# Patient Record
Sex: FEMALE | Race: BLACK OR AFRICAN AMERICAN | Employment: UNEMPLOYED | URBAN - METROPOLITAN AREA
[De-identification: names, ages, dates, MRNs, and addresses within clinical notes are randomized per-mention and may not be internally consistent; named-entity substitution may affect disease eponyms.]

---

## 2021-03-01 ENCOUNTER — OFFICE VISIT (OUTPATIENT)
Dept: ORTHOPEDIC SURGERY | Age: 56
End: 2021-03-01
Payer: MEDICARE

## 2021-03-01 VITALS
HEIGHT: 63 IN | TEMPERATURE: 98 F | SYSTOLIC BLOOD PRESSURE: 112 MMHG | RESPIRATION RATE: 17 BRPM | DIASTOLIC BLOOD PRESSURE: 78 MMHG | BODY MASS INDEX: 41.99 KG/M2 | WEIGHT: 237 LBS | OXYGEN SATURATION: 98 % | HEART RATE: 97 BPM

## 2021-03-01 DIAGNOSIS — M19.072 PRIMARY OSTEOARTHRITIS OF LEFT ANKLE: ICD-10-CM

## 2021-03-01 DIAGNOSIS — M25.572 ACUTE LEFT ANKLE PAIN: ICD-10-CM

## 2021-03-01 DIAGNOSIS — M76.822 POSTERIOR TIBIAL TENDINITIS OF LEFT LOWER EXTREMITY: Primary | ICD-10-CM

## 2021-03-01 DIAGNOSIS — M76.822 POSTERIOR TIBIAL TENDINITIS OF LEFT LOWER EXTREMITY: ICD-10-CM

## 2021-03-01 PROCEDURE — 73610 X-RAY EXAM OF ANKLE: CPT | Performed by: ORTHOPAEDIC SURGERY

## 2021-03-01 PROCEDURE — 99203 OFFICE O/P NEW LOW 30 MIN: CPT | Performed by: ORTHOPAEDIC SURGERY

## 2021-03-01 RX ORDER — HYDROCODONE BITARTRATE AND ACETAMINOPHEN 5; 325 MG/1; MG/1
TABLET ORAL
COMMUNITY

## 2021-03-01 RX ORDER — LEG BRACE
EACH MISCELLANEOUS
Qty: 1 EACH | Refills: 0 | Status: SHIPPED | OUTPATIENT
Start: 2021-03-01 | End: 2021-03-01 | Stop reason: CLARIF

## 2021-03-01 NOTE — PROGRESS NOTES
AMBULATORY PROGRESS NOTE      Patient: Diomedes Silva             MRN: [id number]     SSN: [id number] Body mass index is 41.98 kg/m². YOB: 1965     AGE: 54 y.o. EX: female    PCP: Gustavo Hampton MD       IMPRESSION //  DIAGNOSIS AND TREATMENT PLAN        Aby Lennon has 3-month onset, of worsening pain discomfort and swelling to the posterior medial portion of her left hindfoot. She clinically has left posterior tibial tendinitis, and has distinct swelling to the posterior medial hindfoot. Because of this asymmetric, and distinct swelling the posterior medial portion left hindfoot and tenderness of posterior tibial tendon, inability to form a single stance rise, and recommending MRI of her left ankle to assess the posterior tibial tendon, spring ligament deltoid ligament, and medial talonavicular capsule, and medial gutter of his left ankle. DIAGNOSES  1. Posterior tibial tendinitis of left lower extremity    2. Primary osteoarthritis of left ankle    3. Acute left ankle pain        Orders Placed This Encounter    Generic Supply Order     Short CAM boot    [id number] Ankle Min 3V     Order Specific Question:   Weight bearing? Answer:   No    MRI ANKLE LT WO CONT     Standing Status:   Future     Standing Expiration Date:   5/1/2021     Order Specific Question:   Arthrogram study     Answer:   No    HYDROcodone-acetaminophen (NORCO) 5-325 mg per tablet     Sig: Take  by mouth.  DISCONTD: Leg Brace (Ankle Brace) misc     Sig: by Does Not Apply route. SHORT CAM BOOT     Dispense:  1 Each     Refill:  0        PLAN:    1. Short cam walker boot will be given to her placed on her today for her left side left ankle: This is for the posterior tibial tendon dysfunction, tendinitis  2.   MRI left ankle to assess the posterior tib tendon, spring ligament, and deltoid, talofibular ligament, medial ankle architecture      RTO-I will see her after the MRI is conducted    Patricio  expresses understanding of the diagnosis, treatment plan, and all of their proposed questions were answered to their satisfaction. Patient education has been provided re the diagnoses. Patricio may have a reminder for a \"due or due soon\" health maintenance. I have asked that she contact her primary care provider for follow-up on this health maintenance. HPI //  OBJECTIVE EXAMINATION        Aby Conway IS A 54 y.o. female who is a/an  new patient , presenting to my outpatient office for evaluation of  the following chief complaint(s):     Chief Complaint   Patient presents with    Ankle Pain     left        She presents today, with a 2-3, almost 4-month history of disabling posterior medial left ankle pain. She is seeing the foot and ankle specialist, 1 foot to foot, with a gave her ankle brace, but she is to having disabling pain to the posterior medial portion of left hindfoot. Her typical day starts off with stiffness pain soreness to the medial portion of left ankle, and with increased time during the day, she has worsening swelling and pain in walks with a discernible limp. The patient denies any fever, shakes, or chills. The patient denies any recent falls, twists, or trauma. Patricio denies any known history of rheumatoid arthritis, lupus, gout, psoriasis, or known history of inflammatory arthropathy. Visit Vitals  /78 (BP 1 Location: Left upper arm)   Pulse 97   Temp 98 °F (36.7 °C)   Resp 17   Ht 5' 3\" (1.6 m)   Wt 237 lb (107.5 kg)   SpO2 98%   BMI 41.98 kg/m²       Appearance: Alert, well appearing and pleasant patient who is in no distress, oriented to person, place/time, and who follows commands. This patient is accompanied in the examination room by her  self. There is signs of: no dementia  Psychiatric: Affect/mood are appropriate.  Speech normal in context and clarity, memory intact grossly, no involuntary movements - tremors. Patient arrives to office via: without assistive device:    H EENT (2): Head normocephalic & atraumatic. Both pupils are round     Eye: EOM are intact // Neck: ROM WNL  //  Hearings Intact   Respiratory: Breathing non labored         ANKLE/FOOT left    Psychiatry: Alert, Oriented x 3; Speech normal in context and clarity,            Memory intact grossly, no involuntary movements - tremors, no dementia  Visit Vitals  /78 (BP 1 Location: Left upper arm)   Pulse 97   Temp 98 °F (36.7 °C)   Resp 17   Ht 5' 3\" (1.6 m)   Wt 237 lb (107.5 kg)   SpO2 98%   BMI 41.98 kg/m²      Gait: antalgic and slow  Tenderness: moderate tenderness to posterior tibial tendon,            no to plantar fascia, moderate PM spring ligament, no  for calf or gastrocnemius muscle regions  Cutaneous: moderate swelling to posterior tibial tendon, distinct swelling, to the medial malleoli region of her left ankle           otherwise WNL   Joint Motion: Normal ROM noted to ankle/foot,  Inversion motion is MILDLY diminished. Joint / Tendon Stability: No Ankle or Subtalar instability or joint laxity. No peroneal sublux ability or dislocation  Alignment: Hindfoot remains in mild valgus position with single stance rise attempt    Hindfoot alignment when patient seated: neutral         Calcaneo fibular Impingement is not present with weight bearing   Abduction of hindfoot at TN present, midfoot not present          Medial column collapse TN present, planovalgus alignment is present           Forefoot compensatory Varus is not present  Contractures:  Achilles not present, Gastrocnemius Contractures present  Neuro Motor/Sensory: normal 5/5 strength in all tested muscle groups, no fasciculations noted and no involuntary movements // normal light touch sensation,   Vascular: NL foot/ankle pulses  Lymphatics: No extremity lymphedema, No calf swelling, no tenderness to calf muscles.           CHART REVIEW Sallyanne Duane has been experiencing pain and discomfort confirmed as outlined in the pain assessment outlined below.  was reviewed by Amanda Morrow MD on 3/1/2021. Pain Assessment  3/1/2021   Location of Pain Ankle   Location Modifiers Left   Severity of Pain 0   Quality of Pain Aching; Throbbing   Frequency of Pain Intermittent   Aggravating Factors Standing;Walking   Limiting Behavior No   Relieving Factors (No Data)   Relieving Factors Comment pain medication   Result of Injury No        Dinita Shade Hull  has a past medical history of Diabetes ([address] High cholesterol, Hypertension, and Sleep apnea. Patients is employed at:         Past Medical History:   Diagnosis Date    Diabetes ([address]     High cholesterol     Hypertension     Sleep apnea      Past Surgical History:   Procedure Laterality Date    HX HERNIA REPAIR      HX KNEE REPLACEMENT       Current Outpatient Medications   Medication Sig    HYDROcodone-acetaminophen (NORCO) 5-325 mg per tablet Take  by mouth. No current facility-administered medications for this visit. No Known Allergies  Social History     Occupational History    Not on file   Tobacco Use    Smoking status: Never Smoker    Smokeless tobacco: Never Used   Substance and Sexual Activity    Alcohol use: Yes     Comment: occasional    Drug use: Never    Sexual activity: Not on file     Family History   Problem Relation Age of Onset    Heart Failure Mother     Kidney Disease Sister     Cancer Brother         DIAGNOSTIC LAB DATA      No results found for: HBA1C, HGBE8, EOK0INMB, OMX2TEAM // No results found for: GLU, GLUCPOC     No results found for: DVK7MVPX, XVF4RHYH      No results found for: VITD3, XQVID2, XQVID3, XQVID, VD3RIA, MQOL85IYWTO      REVIEW OF SYSTEMS : 3/1/2021  ALL BELOW ARE Negative except : SEE HPI     All other systems reviewed and are negative. 12 point review of systems otherwise negative unless noted in HPI. DIAGNOSTIC IMAGING       ANKLE X RAYS 3 VIEWS Left   X RAYS AT [facility] [address] [place] [address]  3/1/2021     NON WEIGHT BEARING    SOFT TISSUES:              mild fibula region, moderate medial aspect       mild dorsal midfoot/forefoot       No Ankle joint effusion seen       Soft tissue calcifications not present,        Calcified blood vessels not present    OSSEOUS:         No fractures, subluxations, dislocations       No Osteochondral defects seen  Mineralization: suggests Normal Bone  Bone Spurs Large bone spur seen the dorsal part of this left talus, and the lateral radiographic x-ray, small incidental bone spur seen in prescription of calcaneus, there is some mild spurring and a second old ossicle seen embedded in the deltoid ligament, medially. ALIGNMENT:    Ankle mortise alignment is: Congruent    Tibial plafond and talar dome intact        I have personally reviewed these images of the above study. The interpretation of this study is my professional opinion    Petey Cho MD  3/1/2021  10:21 AM              On this date 03/01/2021 I have spent 35 minutes reviewing previous notes, test results and face to face with the patient discussing the diagnosis and importance of compliance with the treatment plan as well as documenting on the day of the visit. An electronic signature was used to authenticate this note. Petey Cho MD  3/1/2021  7:36 AM      Disclaimer: Sections of this note are dictated using utilizing voice recognition software, which may have resulted in some phonetic based errors in grammar and contents. Even though attempts were made to correct all the mistakes, some may have been missed, and remained in the body of the document. If questions arise, please contact our department.      Petey Cho MD  3/1/2021  7:36 AM

## 2021-03-01 NOTE — PATIENT INSTRUCTIONS
Tendon Injury (Tendinopathy): Care Instructions Your Care Instructions Tendons are tough, flexible tissues that connect muscle to bone. A tendon can hurt or get torn from overuse or aging, especially tendons in the shoulder, elbow, wrist, hip, knee, or ankle. Tendon injuries may be called tendinopathy or tendinitis. Tendon injuries can occur from any motion you have to repeat in a job, sports, or daily activities. Tennis elbow is one common tendon injury. You can treat most tendon problems with over-the-counter pain medicine, rest, changes in your activities, and physical therapy. Follow-up care is a key part of your treatment and safety. Be sure to make and go to all appointments, and call your doctor if you are having problems. It's also a good idea to know your test results and keep a list of the medicines you take. How can you care for yourself at home? · Rest the sore area. You may have to stop doing the activity that caused the tendon pain for a while. · Take an over-the-counter pain medicine, such as acetaminophen (Tylenol), ibuprofen (Advil, Motrin), or naproxen (Aleve). Read and follow all instructions on the label. · Do not take two or more pain medicines at the same time unless the doctor told you to. Many pain medicines have acetaminophen, which is Tylenol. Too much acetaminophen (Tylenol) can be harmful. · Put ice or a cold pack on the sore area for 10 to 20 minutes at a time. Try to do this every 1 to 2 hours for the next 3 days (when you are awake) or until any swelling goes down. Put a thin cloth between the ice and your skin. · Prop up the sore area on a pillow when you ice it or anytime you sit or lie down during the next 3 days. Try to keep it above the level of your heart. This will help reduce swelling.  
· Follow your doctor's advice for wearing and caring for a sling, splint, or cast. In some cases, you may wear one of these for a while to help your tendon heal. 
 · Follow your doctor's advice for stretching and physical therapy. Gently move your joint through its full range of motion. This will prevent stiffness in your joint. · Go back to your activity slowly. Warm up before and stretch after the activity. You also can try making some changes. For example, if a sport caused your tendon pain, alternate the sport with another activity. If using a tool causes pain, switch hands or change your . Stop the activity if it hurts. After the activity, apply ice to prevent pain and swelling. · Do not smoke. Smoking can slow healing. If you need help quitting, talk to your doctor about stop-smoking programs and medicines. These can increase your chances of quitting for good. When should you call for help? Watch closely for changes in your health, and be sure to contact your doctor if: 
  · Your pain gets worse.  
  · You do not get better as expected. Where can you learn more? Go to http://www.gray.com/ Enter A157 in the search box to learn more about \"Tendon Injury (Tendinopathy): Care Instructions. \" Current as of: March 2, 2020               Content Version: 12.6 © 1332-1987 [employer]. Care instructions adapted under license by [employer] (which disclaims liability or warranty for this information). If you have questions about a medical condition or this instruction, always ask your healthcare professional. [address] any warranty or liability for your use of this information.

## 2021-03-02 ENCOUNTER — TELEPHONE (OUTPATIENT)
Dept: ORTHOPEDIC SURGERY | Age: 56
End: 2021-03-02

## 2021-03-02 NOTE — TELEPHONE ENCOUNTER
Patients Order, demographics and office note were faxed to [employer] requesting appt at [facility].  Tel# [phone] fax# [phone]

## 2021-04-26 ENCOUNTER — OFFICE VISIT (OUTPATIENT)
Dept: ORTHOPEDIC SURGERY | Age: 56
End: 2021-04-26
Payer: MEDICARE

## 2021-04-26 VITALS — WEIGHT: 232 LBS | HEIGHT: 63 IN | BODY MASS INDEX: 41.11 KG/M2 | TEMPERATURE: 97.7 F

## 2021-04-26 DIAGNOSIS — M19.072 OSTEOARTHRITIS OF LEFT FOOT, UNSPECIFIED OSTEOARTHRITIS TYPE: Primary | ICD-10-CM

## 2021-04-26 DIAGNOSIS — M76.822 POSTERIOR TIBIAL TENDONITIS, LEFT: ICD-10-CM

## 2021-04-26 PROCEDURE — G8432 DEP SCR NOT DOC, RNG: HCPCS | Performed by: ORTHOPAEDIC SURGERY

## 2021-04-26 PROCEDURE — 3017F COLORECTAL CA SCREEN DOC REV: CPT | Performed by: ORTHOPAEDIC SURGERY

## 2021-04-26 PROCEDURE — G8417 CALC BMI ABV UP PARAM F/U: HCPCS | Performed by: ORTHOPAEDIC SURGERY

## 2021-04-26 PROCEDURE — G8427 DOCREV CUR MEDS BY ELIG CLIN: HCPCS | Performed by: ORTHOPAEDIC SURGERY

## 2021-04-26 PROCEDURE — 99213 OFFICE O/P EST LOW 20 MIN: CPT | Performed by: ORTHOPAEDIC SURGERY

## 2021-04-26 RX ORDER — ALBUTEROL SULFATE 0.83 MG/ML
2.5 SOLUTION RESPIRATORY (INHALATION)
COMMUNITY
Start: 2021-02-15

## 2021-04-26 RX ORDER — AMLODIPINE BESYLATE 10 MG/1
TABLET ORAL
COMMUNITY
Start: 2020-06-12

## 2021-04-26 RX ORDER — OMEPRAZOLE 20 MG/1
CAPSULE, DELAYED RELEASE ORAL
COMMUNITY
Start: 2021-03-25

## 2021-04-26 RX ORDER — VALSARTAN AND HYDROCHLOROTHIAZIDE 320; 25 MG/1; MG/1
TABLET, FILM COATED ORAL
COMMUNITY
Start: 2021-02-15

## 2021-04-26 RX ORDER — MELOXICAM 15 MG/1
15 TABLET ORAL DAILY
Qty: 30 TAB | Refills: 0 | Status: SHIPPED | OUTPATIENT
Start: 2021-04-26

## 2021-04-26 RX ORDER — METFORMIN HYDROCHLORIDE 500 MG/1
TABLET ORAL
COMMUNITY
Start: 2021-03-25

## 2021-04-26 RX ORDER — METAXALONE 800 MG/1
800 TABLET ORAL
COMMUNITY
Start: 2020-11-25

## 2021-04-26 RX ORDER — FUROSEMIDE 20 MG/1
TABLET ORAL
COMMUNITY
Start: 2020-09-17

## 2021-04-26 RX ORDER — METOPROLOL SUCCINATE 50 MG/1
TABLET, EXTENDED RELEASE ORAL
COMMUNITY
Start: 2021-03-25

## 2021-04-26 RX ORDER — DICLOFENAC SODIUM 10 MG/G
GEL TOPICAL
COMMUNITY
Start: 2021-02-15

## 2021-04-26 NOTE — PROGRESS NOTES
AMBULATORY PROGRESS NOTE      Patient: Kalyn Costello             MRN: [id number]     SSN: [id number] Body mass index is 41.1 kg/m². YOB: 1965     AGE: 54 y.o. EX: female    PCP: Sabrina Almodovar MD       IMPRESSION //  DIAGNOSIS AND TREATMENT PLAN        Aby Agudelo has OA at medial TN region and boen marrow edema in this medial TN region. There is posterior tibial tendinitis , but no full thickness tear. Her foot needs postero medial support/protection. DIAGNOSES  1. Osteoarthritis of left foot, unspecified osteoarthritis type    2. Posterior tibial tendonitis, left        Orders Placed This Encounter    Generic Supply Order     left medially-posted extended SMO brace  Please provide recommendations for shoes such as [employer] 928 shoes or anything else    albuterol (PROVENTIL VENTOLIN) 2.5 mg /3 mL (0.083 %) nebu     Sig: Take 2.5 mg by inhalation three (3) times daily as needed.  amLODIPine (NORVASC) 10 mg tablet     Sig: take 1 tablet by mouth once daily    diclofenac (VOLTAREN) 1 % gel     Sig: apply 4 grams to affected area four times a day    furosemide (LASIX) 20 mg tablet     Sig: take 1 tablet by mouth once daily if needed    metaxalone (SKELAXIN) 800 mg tablet     Sig: Take 800 mg by mouth three (3) times daily as needed.  metFORMIN (GLUCOPHAGE) 500 mg tablet     Sig: take 1 tablet by mouth twice a day with food    metoprolol succinate (TOPROL-XL) 50 mg XL tablet     Sig: take 1 tablet by mouth once daily    omeprazole (PRILOSEC) 20 mg capsule     Sig: take 1 capsule by mouth twice a day    valsartan-hydroCHLOROthiazide (DIOVAN-HCT) 320-25 mg per tablet     Sig: take 1 tablet by mouth once daily    meloxicam (MOBIC) 15 mg tablet     Sig: Take 1 Tab by mouth daily. Dispense:  30 Tab     Refill:  0        PLAN:    1. I will provide a DME order: left medially-posted extended SMO brace.   2. I cannot provide a Rx of anti-inflammatories as pt has a hiatal hernia. 3. I will write a Rx of Mobic 1 PO once a day 30 tabs. RTO-  6 weeks     Blanca Faust  expresses understanding of the diagnosis, treatment plan, and all of their proposed questions were answered to their satisfaction. Patient education has been provided re the diagnoses. Blanca Faust may have a reminder for a \"due or due soon\" health maintenance. I have asked that she contact her primary care provider for follow-up on this health maintenance. HPI //  OBJECTIVE EXAMINATION        Aby Sebastian IS A 54 y.o. female who is a/an  established patient, presenting to my outpatient office for evaluation of  the following chief complaint(s):     Chief Complaint   Patient presents with    Follow-up     left ankle MRI       Pt presents today with a left Short CAM walker boot. Pt reports continued left ankle pain that exacerbates when WB. She recalls almost falling because her left ankle almost \"gave out on her. \"    Pt expressed that she does not want Tramadol as it makes her nervous. She is a diabetic. She notes she has a hiatal hernia. Visit Vitals  Temp 97.7 °F (36.5 °C)   Ht 5' 3\" (1.6 m)   Wt 232 lb (105.2 kg)   BMI 41.10 kg/m²       Appearance: Alert, well appearing and pleasant patient who is in no distress, oriented to person, place/time, and who follows commands. This patient is accompanied in the examination room by her  self. There is signs of: no dementia  Psychiatric: Affect/mood are appropriate. Speech normal in context and clarity, memory intact grossly, no involuntary movements - tremors. Patient arrives to office via: with assistive device: Left short CAM walker boot   H EENT (2): Head normocephalic & atraumatic.  Both pupils are round     Eye: EOM are intact // Neck: ROM WNL  //  Hearings Intact   Respiratory: Breathing non labored     ANKLE/FOOT left    Gait: uses assistive device   Tenderness: moderate med ial Tn region   Cutaneous: medial Tn swelling  Joint Motion:  Ankle and STare WNL  Joint / Tendon Stability: No Ankle or Subtalar instability or joint laxity. No peroneal sublux ability or dislocation  Alignment: neutral Hindfoot,  Pes planus  Neuro Motor/Sensory: NL/NL  Vascular: NL foot/ankle pulses,   Lymphatics: No extremity lymphedema, No calf swelling, no tenderness to calf muscles. CHART REVIEW     Aby Melton has been experiencing pain and discomfort confirmed as outlined in the pain assessment outlined below.  was reviewed by Adele Ariza MD on 4/26/2021. Pain Assessment  3/1/2021   Location of Pain Ankle   Location Modifiers Left   Severity of Pain 0   Quality of Pain Aching; Throbbing   Frequency of Pain Intermittent   Aggravating Factors Standing;Walking   Limiting Behavior No   Relieving Factors (No Data)   Relieving Factors Comment pain medication   Result of Injury No        Aby Melton  has a past medical history of Diabetes ([facility] [address] High cholesterol, Hypertension, and Sleep apnea. Patients is employed at:         Past Medical History:   Diagnosis Date    Diabetes ([facility] [address]     High cholesterol     Hypertension     Sleep apnea      Past Surgical History:   Procedure Laterality Date    HX HERNIA REPAIR      HX KNEE REPLACEMENT       Current Outpatient Medications   Medication Sig    albuterol (PROVENTIL VENTOLIN) 2.5 mg /3 mL (0.083 %) nebu Take 2.5 mg by inhalation three (3) times daily as needed.  amLODIPine (NORVASC) 10 mg tablet take 1 tablet by mouth once daily    diclofenac (VOLTAREN) 1 % gel apply 4 grams to affected area four times a day    furosemide (LASIX) 20 mg tablet take 1 tablet by mouth once daily if needed    metaxalone (SKELAXIN) 800 mg tablet Take 800 mg by mouth three (3) times daily as needed.     metFORMIN (GLUCOPHAGE) 500 mg tablet take 1 tablet by mouth twice a day with food    metoprolol succinate (TOPROL-XL) 50 mg XL tablet take 1 tablet by mouth once daily    omeprazole (PRILOSEC) 20 mg capsule take 1 capsule by mouth twice a day    valsartan-hydroCHLOROthiazide (DIOVAN-HCT) 320-25 mg per tablet take 1 tablet by mouth once daily    meloxicam (MOBIC) 15 mg tablet Take 1 Tab by mouth daily.  HYDROcodone-acetaminophen (NORCO) 5-325 mg per tablet Take  by mouth. No current facility-administered medications for this visit. No Known Allergies  Social History     Occupational History    Not on file   Tobacco Use    Smoking status: Never Smoker    Smokeless tobacco: Never Used   Substance and Sexual Activity    Alcohol use: Yes     Comment: occasional    Drug use: Never    Sexual activity: Not on file     Family History   Problem Relation Age of Onset    Heart Failure Mother     Kidney Disease Sister     Cancer Brother         DIAGNOSTIC LAB DATA      No results found for: HBA1C, HGBE8, SVP7UVQZ, UZY6SXTX // No results found for: GLU, GLUCPOC     No results found for: OSY9KSQM, ACF1WJSU      No results found for: VITD3, XQVID2, XQVID3, XQVID, VD3RIA, SZGL10UBMQT      REVIEW OF SYSTEMS : 4/26/2021  ALL BELOW ARE Negative except : SEE HPI     All other systems reviewed and are negative. 12 point review of systems otherwise negative unless noted in HPI. DIAGNOSTIC IMAGING [facility] FACILITY IMAGING : INTERPRETATION BY RADIOLOGIST      2021 March  MR ANKLE WO IV CON LEFT3/15/2021  [facility]  Result Impression     1. 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 In addition, there is intense marrow edema and degenerative change involving the head and neck of the talus/talonavicular joint suggesting sequela of mechanical stress reaction.   2. The posterior tibialis tendon itself is intact however, there is a thin curvilinear wavy band of low signal overlying the posterior tibialis tendon with surrounding edema and a small fluid collection that most likely represents injury to the anterior portion of the flexor retinaculum. Signed By: Charlene Lenz MD on 3/15/2021 4:18 PM   Result Narrative   EXAMINATION: MR ANKLE WO IV CON LEFT    CLINICAL INDICATION/HISTORY: V12.334: Bilateral tibialis tendinitis  M76.822: Bilateral tibialis tendinitis   >Additional: None    TECHNIQUE: T1 weighted sagittal, STIR sagittal, proton FSE axial, fat suppressed T2 FSE axial, fat suppressed T2 FSE coronal, proton FSE oblique axial.    COMPARISON EXAMS: None.    _______________    FINDINGS:    ANKLE LIGAMENTS:  Anterior and posterior inferior tibiofibular ligaments and syndesmosis: Intact  Anterior and posterior talofibular and the calcaneofibular ligaments: The CFL is moderately thickened but intact, likely sequela of old injury. The ATFL and PT FL are both intact. Deltoid and spring ligaments: There is a marker indicating site of clinical concern along the medial aspect of the ankle. Deep to the marker , the tibial and plantar Spring ligament complex is mildly thickened and edematous adjacent to the head of the talus and become poorly defined with surrounding soft tissue edema along the medial aspect of the ankle. Findings most likely represents combination of repetitive subacute on chronic injury. ANKLE AND FOOT TENDONS:  Lateral: Peroneus longus and brevis tendons: Intact. Medial: Posterior tibialis, flexor digitorum longus and flexor hallucis longus tendons: There is mild tenosynovial fluid within the posterior tibialis tendon sheath which is otherwise intact, normal in signal and morphology.  Moderate soft tissue swelling and edema is seen overlying the posterior tibialis tendon as it courses to its insertion on the medial navicular with an abnormal appearance of the flexor retinaculum which appears disrupted anteriorly which is edematous and containing a small fluid collection on axial series image 16. The flexor digitorum longus and flexor hallucis longus tendons are intact. Extensor tendons: Intact and normal in signal intensity. Achilles tendon: Achilles tendon is normal in morphology and signal intensity.      Kager's fat pad is unremarkable.  No retrocalcaneal bursitis. OSSEOUS:  Tibiotalar and subtalar joints: Prominent posterior superior process of the calcaneus is noted. However, no surrounding inflammatory change. No joint effusion. No fracture or contusion. Intact talar dome and symmetric ankle mortise. There is mild degenerative change of the tibiotalar joint with her thickness cartilage loss and a few scattered subchondral cystic foci including along the lateral talar dome. There is fairly pronounced edema and degenerative subchondral cystic changes at the head and neck of the talus. There is slight medial deviation of the mid talar axis, medial to the first metatarsal with a hindfoot valgus appearance. Prominent dorsal osteophyte formation at the talonavicular joint with associated moderate periarticular edema. Included midfoot articulations: Otherwise, limited assessment grossly unremarkable. OTHER:  Sinus tarsi: Near complete replacement of the normal tarsal sinus fat with low signal scarring and edema throughout the tarsal sinus compatible with sinus Tarsi syndrome. Plantar fascia: Minimal thickening proximally above the medial band with mild surrounding edema and a tiny enthesophyte. Tarsal tunnel: No discrete mass or fluid collection. Regional soft tissues: Generalized subcutaneous  edema about the posterior and lateral aspect of the ankle.    _______________           I have reviewed the radiology transcribed results/ and I have reviewed the images of the above study. An electronic signature was used to authenticate this note.       Disclaimer: Sections of this note are dictated using utilizing voice recognition software, which may have resulted in some phonetic based errors in grammar and contents. Even though attempts were made to correct all the mistakes, some may have been missed, and remained in the body of the document. If questions arise, please contact our department.      Hall Mohs, as dictated by Felicitas Paige MD  4/26/2021  8:12 AM

## 2021-05-18 ENCOUNTER — TELEPHONE (OUTPATIENT)
Dept: ORTHOPEDIC SURGERY | Age: 56
End: 2021-05-18

## 2021-05-18 NOTE — TELEPHONE ENCOUNTER
Thomas iSmon from [facility] is following up on a detailed written order request faxed to [phone] for providers signature on 5/12/21. Please advise Thomas Simon status at [phone].

## 2021-05-25 NOTE — TELEPHONE ENCOUNTER
Marko Cortez from [place] [address] called again in regards to this. She stated the correct fax number is [phone].

## 2021-05-27 ENCOUNTER — OFFICE VISIT (OUTPATIENT)
Dept: ORTHOPEDIC SURGERY | Age: 56
End: 2021-05-27
Payer: MEDICARE

## 2021-05-27 VITALS
HEIGHT: 64 IN | RESPIRATION RATE: 16 BRPM | HEART RATE: 75 BPM | WEIGHT: 237 LBS | OXYGEN SATURATION: 99 % | BODY MASS INDEX: 40.46 KG/M2

## 2021-05-27 DIAGNOSIS — M25.562 CHRONIC PAIN OF LEFT KNEE: Primary | ICD-10-CM

## 2021-05-27 DIAGNOSIS — Z96.651 HISTORY OF RIGHT KNEE JOINT REPLACEMENT: ICD-10-CM

## 2021-05-27 DIAGNOSIS — G89.29 CHRONIC PAIN OF LEFT KNEE: Primary | ICD-10-CM

## 2021-05-27 DIAGNOSIS — M17.12 PRIMARY OSTEOARTHRITIS OF LEFT KNEE: ICD-10-CM

## 2021-05-27 DIAGNOSIS — E11.9 CONTROLLED TYPE 2 DIABETES MELLITUS WITHOUT COMPLICATION, UNSPECIFIED WHETHER LONG TERM INSULIN USE (HCC): ICD-10-CM

## 2021-05-27 PROBLEM — I10 HYPERTENSION: Status: ACTIVE | Noted: 2021-05-27

## 2021-05-27 PROCEDURE — 3046F HEMOGLOBIN A1C LEVEL >9.0%: CPT | Performed by: ORTHOPAEDIC SURGERY

## 2021-05-27 PROCEDURE — G8756 NO BP MEASURE DOC: HCPCS | Performed by: ORTHOPAEDIC SURGERY

## 2021-05-27 PROCEDURE — 99204 OFFICE O/P NEW MOD 45 MIN: CPT | Performed by: ORTHOPAEDIC SURGERY

## 2021-05-27 PROCEDURE — 3017F COLORECTAL CA SCREEN DOC REV: CPT | Performed by: ORTHOPAEDIC SURGERY

## 2021-05-27 PROCEDURE — 73564 X-RAY EXAM KNEE 4 OR MORE: CPT | Performed by: ORTHOPAEDIC SURGERY

## 2021-05-27 PROCEDURE — G8432 DEP SCR NOT DOC, RNG: HCPCS | Performed by: ORTHOPAEDIC SURGERY

## 2021-05-27 PROCEDURE — G8417 CALC BMI ABV UP PARAM F/U: HCPCS | Performed by: ORTHOPAEDIC SURGERY

## 2021-05-27 PROCEDURE — G8427 DOCREV CUR MEDS BY ELIG CLIN: HCPCS | Performed by: ORTHOPAEDIC SURGERY

## 2021-05-27 PROCEDURE — 2022F DILAT RTA XM EVC RTNOPTHY: CPT | Performed by: ORTHOPAEDIC SURGERY

## 2021-05-27 NOTE — PROGRESS NOTES
Patient: Mini Whatley                MRN: [id number]       SSN: [id number]  YOB: 1965        AGE: 64 y.o. SEX: female  Body mass index is 40.68 kg/m². PCP: Pelon Navarro MD  05/27/21    Lucille Ohara is today for evaluation of left knee pain she has had a revision on the right side with a Lisha hinged knee replacement is reasonably pleased with it she is failed nonoperative management on the left knee less than a 5-minute level walking tolerance pain at night pain with activities of daily living and she is very frustrated with it denies any groin pain she is diabetic she does suffer from obesity with a BMI at rate at 40.6 denies fevers or chills.     In the examination of today very pleasant lady in no acute distress walks with an antalgic gait owing to the left side both feet are warm and well perfused the knee replacement is very nicely healed incision she has excellent flexion and maybe a degree or 2 recurvatum but not severely so in the knee itself is stable hips rotate well both feet warm and well-perfused slight evidence of neuropathy involving sharp testing bilaterally both feet warm and well perfused no peripheral edema    Joint line tenderness in all 3 compartments worse medially about a 3 degree fixed flexion deformity and bends to 115 degrees on the left side    X-rays today AP tunnel lateral and skyline involving the left knee confirm severe arthritis and this is on 5/27/2021 she like to have her left knee replaced    Risks and benefits described including but not limited to infection DVT pulmonary embolism anesthetic complications blood loss requiring transfusion chronic pain instability implant longevity arthrofibrosis and also the need for bending and straightening knee on an hourly basis to avoid complications        REVIEW OF SYSTEMS:      CON: negative  EYE: negative   ENT: negative  RESP: negative  GI:    negative   :  negative  MSK: Positive  A twelve point review of systems was completed, positives noted and all other systems were reviewed and are negative          Past Medical History:   Diagnosis Date    Diabetes ([facility] [address]     High cholesterol     Hypertension     Sleep apnea        Family History   Problem Relation Age of Onset    Heart Failure Mother     Kidney Disease Sister     Cancer Brother        Current Outpatient Medications   Medication Sig Dispense Refill    albuterol (PROVENTIL VENTOLIN) 2.5 mg /3 mL (0.083 %) nebu Take 2.5 mg by inhalation three (3) times daily as needed.  amLODIPine (NORVASC) 10 mg tablet take 1 tablet by mouth once daily      diclofenac (VOLTAREN) 1 % gel apply 4 grams to affected area four times a day      furosemide (LASIX) 20 mg tablet take 1 tablet by mouth once daily if needed      metaxalone (SKELAXIN) 800 mg tablet Take 800 mg by mouth three (3) times daily as needed.  metFORMIN (GLUCOPHAGE) 500 mg tablet take 1 tablet by mouth twice a day with food      metoprolol succinate (TOPROL-XL) 50 mg XL tablet take 1 tablet by mouth once daily      omeprazole (PRILOSEC) 20 mg capsule take 1 capsule by mouth twice a day      valsartan-hydroCHLOROthiazide (DIOVAN-HCT) 320-25 mg per tablet take 1 tablet by mouth once daily      meloxicam (MOBIC) 15 mg tablet Take 1 Tab by mouth daily. 30 Tab 0    HYDROcodone-acetaminophen (NORCO) 5-325 mg per tablet Take  by mouth. No Known Allergies    Past Surgical History:   Procedure Laterality Date    HX HERNIA REPAIR      HX KNEE REPLACEMENT         Social History     Socioeconomic History    Marital status:      Spouse name: Not on file    Number of children: Not on file    Years of education: Not on file    Highest education level: Not on file   Occupational History    Not on file   Tobacco Use    Smoking status: Never Smoker    Smokeless tobacco: Never Used   Vaping Use    Vaping Use: Never used   Substance and Sexual Activity    Alcohol use:  Yes Comment: occasional    Drug use: Never    Sexual activity: Not on file   Other Topics Concern    Not on file   Social History Narrative    Not on file     Social Determinants of Health     Financial Resource Strain:     Difficulty of Paying Living Expenses:    Food Insecurity:     Worried About Running Out of Food in the Last Year:     [address] in the Last Year:    Transportation Needs:     Lack of Transportation (Medical):  Lack of Transportation (Non-Medical):    Physical Activity:     Days of Exercise per Week:     Minutes of Exercise per Session:    Stress:     Feeling of Stress :    Social Connections:     Frequency of Communication with Friends and Family:     Frequency of Social Gatherings with Friends and Family:     Attends [employer] Services:     Active Member of Clubs or Organizations:     Attends Club or Organization Meetings:     Marital Status:    Intimate Partner Violence:     Fear of Current or Ex-Partner:     Emotionally Abused:     Physically Abused:     Sexually Abused:        Visit Vitals  Pulse 75   Resp 16   Ht 5' 4\" (1.626 m)   Wt 237 lb (107.5 kg)   SpO2 99%   BMI 40.68 kg/m²         PHYSICAL EXAMINATION:  GENERAL: Alert and oriented x3, in no acute distress, well-developed, well-nourished, afebrile. HEART: No JVD. EYES: No scleral icterus   NECK: No significant lymphadenopathy   LUNGS: No respiratory compromise or indrawing  ABDOMEN: Soft, non-tender, non-distended. Electronically signed by:  Nora Alexander MD

## 2021-06-01 NOTE — TELEPHONE ENCOUNTER
Anabella Paulino from [address] [facility] called again and states they still have not received the order. Anabella Paulino is requesting the order be resent to Fax # [phone]. I double checked with Ms. Anabella Paulino and she said that is the only fax number they have. Anabella Paulino Tel. [phone].

## 2021-06-14 ENCOUNTER — OFFICE VISIT (OUTPATIENT)
Dept: ORTHOPEDIC SURGERY | Age: 56
End: 2021-06-14
Payer: MEDICARE

## 2021-06-14 VITALS — WEIGHT: 238 LBS | BODY MASS INDEX: 40.85 KG/M2

## 2021-06-14 DIAGNOSIS — M19.072 OSTEOARTHRITIS OF LEFT FOOT, UNSPECIFIED OSTEOARTHRITIS TYPE: Primary | ICD-10-CM

## 2021-06-14 PROCEDURE — G8417 CALC BMI ABV UP PARAM F/U: HCPCS | Performed by: ORTHOPAEDIC SURGERY

## 2021-06-14 PROCEDURE — 99213 OFFICE O/P EST LOW 20 MIN: CPT | Performed by: ORTHOPAEDIC SURGERY

## 2021-06-14 PROCEDURE — 3017F COLORECTAL CA SCREEN DOC REV: CPT | Performed by: ORTHOPAEDIC SURGERY

## 2021-06-14 PROCEDURE — G8756 NO BP MEASURE DOC: HCPCS | Performed by: ORTHOPAEDIC SURGERY

## 2021-06-14 PROCEDURE — G8427 DOCREV CUR MEDS BY ELIG CLIN: HCPCS | Performed by: ORTHOPAEDIC SURGERY

## 2021-06-14 PROCEDURE — G8432 DEP SCR NOT DOC, RNG: HCPCS | Performed by: ORTHOPAEDIC SURGERY

## 2021-06-14 RX ORDER — TRAMADOL HYDROCHLORIDE 50 MG/1
50 TABLET ORAL
Qty: 24 TABLET | Refills: 0 | Status: SHIPPED | OUTPATIENT
Start: 2021-06-14 | End: 2021-06-22

## 2021-06-14 NOTE — PROGRESS NOTES
AMBULATORY PROGRESS NOTE      Patient: Verona Arauz             MRN: [id number]     SSN: [id number] Body mass index is 40.85 kg/m². YOB: 1965     AGE: 64 y.o. EX: female    PCP: Alice Israel MD       IMPRESSION //  DIAGNOSIS AND TREATMENT PLAN        Aby Gorman has a diagnosis of:      DIAGNOSES    1. Osteoarthritis of left foot, unspecified osteoarthritis type        Orders Placed This Encounter    traMADoL (Ultram) 50 mg tablet     Sig: Take 1 Tablet by mouth every eight (8) hours as needed for Pain for up to 8 days. Max Daily Amount: 150 mg. Dispense:  24 Tablet     Refill:  0        PLAN:    1. I will prescribe her Tramadol to manage her pain      RTO-  1 month    Verona Arauz  expresses understanding of the diagnosis, treatment plan, and all of their proposed questions were answered to their satisfaction. Patient education has been provided re the diagnoses. HPI //  OBJECTIVE EXAMINATION        Aby Gorman IS A 64 y.o. female who is a/an  established patient, presenting to my outpatient office for evaluation of  the following chief complaint(s):     Chief Complaint   Patient presents with    Ankle Pain     left       At LOV Pt presented with a left Short CAM walker boot. Pt reported continued left ankle pain that exacerbates when WB. She recalled almost falling because her left ankle almost \"gave out on her. \" Pt expressed that she did not want Tramadol as it makes her nervous. I will provided a DME order: left medially-posted extended SMO brace and wrote a Rx of Mobic 1 PO once a day 30 tabs. I did not provide a Rx of anti-inflammatories as pt has a hiatal hernia. Since LOV she has been fitted for a custom orthotic but has not received it yet. Pt notes PMHx of a hiatal hernia and DM.      Visit Vitals  Wt 238 lb (108 kg)   BMI 40.85 kg/m²       Appearance: Alert, well appearing and pleasant patient who is in no distress, oriented to person, place/time, and who follows commands. This patient is accompanied in the examination room by her  self. There is signs of: no dementia  Psychiatric: Affect/mood are appropriate. Speech normal in context and clarity, memory intact grossly, no involuntary movements - tremors. Patient arrives to office via: with assistive device: single-point cane. H EENT (2): Head normocephalic & atraumatic. Eye: pupils are round// EOM are intact // Neck: ROM WNL  // Hearings Intact   Respiratory: Breathing non labored     ANKLE/FOOT left    Gait: normal and uses assistive device   Tenderness: mild tibial posterior tib tendon medial ankle  Cutaneous:  WNL. Joint Motion:   Mild/moderate swelling, medial hindfoot, mild to the lateral hindfoot  Joint / Tendon Stability: No Ankle or Subtalar instability or joint laxity. No peroneal sublux ability or dislocation  Alignment: neutral Hindfoot,    Neuro Motor/Sensory: NL/NL  Vascular: NL foot/ankle pulses, swelling medially and laterally over   Lymphatics: No extremity lymphedema, No calf swelling, no tenderness to calf muscles. CHART REVIEW     Aby Abel has been experiencing pain and discomfort confirmed as outlined in the pain assessment outlined below.  was reviewed by Cedric Miramontes MD on 6/14/2021. Pain Assessment  6/14/2021   Location of Pain Ankle   Location Modifiers Left   Severity of Pain 9   Quality of Pain Dull;Aching   Quality of Pain Comment -   Duration of Pain -   Frequency of Pain Constant   Date Pain First Started -   Date Pain First Started Comment -   Aggravating Factors -   Limiting Behavior -   Relieving Factors Nothing   Relieving Factors Comment -   Result of Injury -        Aby Abel  has a past medical history of Diabetes ([facility] [address] High cholesterol, Hypertension, and Sleep apnea.      Patients is employed at:         Past Medical History:   Diagnosis Date    Diabetes ([facility] [address]  High cholesterol     Hypertension     Sleep apnea      Past Surgical History:   Procedure Laterality Date    HX HERNIA REPAIR      HX KNEE REPLACEMENT       Current Outpatient Medications   Medication Sig    traMADoL (Ultram) 50 mg tablet Take 1 Tablet by mouth every eight (8) hours as needed for Pain for up to 8 days. Max Daily Amount: 150 mg.    albuterol (PROVENTIL VENTOLIN) 2.5 mg /3 mL (0.083 %) nebu Take 2.5 mg by inhalation three (3) times daily as needed.  amLODIPine (NORVASC) 10 mg tablet take 1 tablet by mouth once daily    diclofenac (VOLTAREN) 1 % gel apply 4 grams to affected area four times a day    furosemide (LASIX) 20 mg tablet take 1 tablet by mouth once daily if needed    metaxalone (SKELAXIN) 800 mg tablet Take 800 mg by mouth three (3) times daily as needed.  metFORMIN (GLUCOPHAGE) 500 mg tablet take 1 tablet by mouth twice a day with food    metoprolol succinate (TOPROL-XL) 50 mg XL tablet take 1 tablet by mouth once daily    omeprazole (PRILOSEC) 20 mg capsule take 1 capsule by mouth twice a day    valsartan-hydroCHLOROthiazide (DIOVAN-HCT) 320-25 mg per tablet take 1 tablet by mouth once daily    meloxicam (MOBIC) 15 mg tablet Take 1 Tab by mouth daily.  HYDROcodone-acetaminophen (NORCO) 5-325 mg per tablet Take  by mouth. No current facility-administered medications for this visit.      No Known Allergies  Social History     Occupational History    Not on file   Tobacco Use    Smoking status: Never Smoker    Smokeless tobacco: Never Used   Vaping Use    Vaping Use: Never used   Substance and Sexual Activity    Alcohol use: Yes     Comment: occasional    Drug use: Never    Sexual activity: Not on file     Family History   Problem Relation Age of Onset    Heart Failure Mother     Kidney Disease Sister     Cancer Brother         DIAGNOSTIC LAB DATA      No results found for: HBA1C, VDP3BSLA, FGJ7SWQD // No results found for: GLU, GLUCPOC     No results found for: OZJ7EKRQ, [facility]      No results found for: VITD3, Michaeline Virgilio, XQVID, VD3RIA, [id number]      REVIEW OF SYSTEMS : 6/14/2021  ALL BELOW ARE Negative except : SEE HPI     All other systems reviewed and are negative. 12 point review of systems otherwise negative unless noted in HPI. DIAGNOSTIC IMAGING /ORDERS       Orders Placed This Encounter    traMADoL (Ultram) 50 mg tablet     Sig: Take 1 Tablet by mouth every eight (8) hours as needed for Pain for up to 8 days. Max Daily Amount: 150 mg. Dispense:  24 Tablet     Refill:  0        There are no x-rays done today. I have reviewed the results of the above study. The interpretation of this study is my professional opinion. An electronic signature was used to authenticate this note. Disclaimer: Sections of this note are dictated using utilizing voice recognition software, which may have resulted in some phonetic based errors in grammar and contents. Even though attempts were made to correct all the mistakes, some may have been missed, and remained in the body of the document. If questions arise, please contact our department. Amelia Landry may have a reminder for a \"due or due soon\" health maintenance. I have asked that she contact her primary care provider for follow-up on this health maintenance.       Genia Dougherty, as dictated by Jaycee Lee MD  6/14/2021  8:23 AM

## 2021-10-25 NOTE — PROGRESS NOTES
AMBULATORY PROGRESS NOTE      Patient: Sirisha Mauricio             MRN: [id number]     SSN: [id number] Body mass index is 38 kg/m². YOB: 1965     AGE: 64 y.o. EX: female    PCP: Gilberto Cagle MD       IMPRESSION //  DIAGNOSIS AND TREATMENT PLAN        Aby Zamorano has a diagnosis of:        DIAGNOSES    1. Osteoarthritis of left foot, unspecified osteoarthritis type    2. Controlled type 2 diabetes mellitus without complication, unspecified whether long term insulin use ([facility]        No orders of the defined types were placed in this encounter. PLAN:    1. Continue using OTC cream for nerve pain    RTO 3 months    There are no Patient Instructions on file for this visit. Please follow up with your PCP for any health maintenance as recommended         Sirisha Mauricio  expresses understanding of the diagnosis, treatment plan, and all of their proposed questions were answered to their satisfaction. Patient education has been provided re the diagnoses. HPI //  Ziggy Indira Zamorano IS A 64 y.o. female who is a/an  established patient, presenting to my outpatient office for evaluation of  the following chief complaint(s):     Chief Complaint   Patient presents with    Ankle Pain     left follow up       At [facility] presented w/ left ankle pain. I will prescribe her Tramadol to manage her pain. I did not provide a Rx of anti-inflammatories as pt has a hiatal hernia. Since LOV Aby Zamorano continues to endorse left ankle pain. Notes she still has burning sensations and tingling in her left foot. She is using an OTC nerve cream for DM ,which provides some relief.     Visit Vitals  Pulse 84   Temp 98.3 °F (36.8 °C)   Ht 5' 4\" (1.626 m)   Wt 221 lb 6.4 oz (100.4 kg)   SpO2 99%   BMI 38.00 kg/m²       Appearance: Alert, well appearing and pleasant patient who is in no distress, oriented to person, place/time, and who follows commands. Normal dress/motor activity/thought processes/memory. This patient is accompanied in the examination room by her  self. There is signs of: no dementia  Patient arrives to office via: without assistive device:     Psychiatric:  Normal Affect/mood. Judgement, behavior, and conduct are appropriate. Speech normal in context and clarity, memory intact grossly, no involuntary movements - tremors. H EENT (2): Head normocephalic & atraumatic. Eye: pupils are round// EOM are intact // Neck: ROM WNL  // Hearings Intact   Respiratory: Breathing non labored     ANKLE/FOOT left     Gait: normal and uses assistive device   Tenderness: mild tibial posterior tib tendon medial ankle  Cutaneous:  WNL. Joint Motion:   Mild/moderate swelling, medial hindfoot, mild to the lateral hindfoot  Joint / Tendon Stability: No Ankle or Subtalar instability or joint laxity. No peroneal sublux ability or dislocation  Alignment: neutral Hindfoot,    Neuro Motor/Sensory: NL/NL  Vascular: NL foot/ankle pulses, swelling medially and laterally over   Lymphatics: No extremity lymphedema, No calf swelling, no tenderness to calf muscles       CHART REVIEW     Aby Gaffney has been experiencing pain and discomfort confirmed as outlined in the pain assessment outlined below.  was reviewed by Richardson Mooney MD on 11/1/2021. Pain Assessment  11/1/2021   Location of Pain Ankle   Location Modifiers Left   Severity of Pain 0   Quality of Pain -   Quality of Pain Comment -   Duration of Pain -   Frequency of Pain -   Date Pain First Started -   Date Pain First Started Comment -   Aggravating Factors -   Limiting Behavior -   Relieving Factors -   Relieving Factors Comment -   Result of Injury -        Aby Gaffney  has a past medical history of Diabetes ([facility] [address] High cholesterol, Hypertension, Left ankle pain, and Sleep apnea.      Patients is employed at:         Past Medical History:   Diagnosis Date    Diabetes ([facility] [address]     High cholesterol     Hypertension     Left ankle pain     Sleep apnea      Past Surgical History:   Procedure Laterality Date    HX HERNIA REPAIR      HX KNEE REPLACEMENT       Current Outpatient Medications   Medication Sig    albuterol (PROVENTIL VENTOLIN) 2.5 mg /3 mL (0.083 %) nebu Take 2.5 mg by inhalation three (3) times daily as needed.  amLODIPine (NORVASC) 10 mg tablet take 1 tablet by mouth once daily    diclofenac (VOLTAREN) 1 % gel apply 4 grams to affected area four times a day    furosemide (LASIX) 20 mg tablet take 1 tablet by mouth once daily if needed    metaxalone (SKELAXIN) 800 mg tablet Take 800 mg by mouth three (3) times daily as needed.  metFORMIN (GLUCOPHAGE) 500 mg tablet take 1 tablet by mouth twice a day with food    metoprolol succinate (TOPROL-XL) 50 mg XL tablet take 1 tablet by mouth once daily    omeprazole (PRILOSEC) 20 mg capsule take 1 capsule by mouth twice a day    valsartan-hydroCHLOROthiazide (DIOVAN-HCT) 320-25 mg per tablet take 1 tablet by mouth once daily    meloxicam (MOBIC) 15 mg tablet Take 1 Tab by mouth daily.  HYDROcodone-acetaminophen (NORCO) 5-325 mg per tablet Take  by mouth. No current facility-administered medications for this visit.      No Known Allergies  Social History     Occupational History    Not on file   Tobacco Use    Smoking status: Never Smoker    Smokeless tobacco: Never Used   Vaping Use    Vaping Use: Never used   Substance and Sexual Activity    Alcohol use: Yes     Comment: occasional    Drug use: Never    Sexual activity: Not on file     Family History   Problem Relation Age of Onset    Heart Failure Mother     Kidney Disease Sister     Cancer Brother         DIAGNOSTIC LAB DATA      No results found for: HBA1C, HER5YYEP, GDW1MRQJ // No results found for: GLU, GLUCPOC     No results found for: MBW3MOOW, XYJ6LOFN      No results found for: Michelle Jones, XQVID3, XQVID, VD3RIA, [id number]     Drug Screen Most Recent Result Date    No resulted procedures found. REVIEW OF SYSTEMS : 11/1/2021  ALL BELOW ARE Negative except : SEE HPI     All other systems reviewed and are negative. 12 point review of systems otherwise negative unless noted in HPI. RADIOGRAPHS// IMAGING//DIAGNOSTIC DATA       I have personally reviewed the images of the above study. The interpretation of this study is my professional opinion                  Disclaimer:     Sections of this note are dictated using utilizing voice recognition software, which may have resulted in some phonetic based errors in grammar and contents. Even though attempts were made to correct all the mistakes, some may have been missed, and remained in the body of the document. If questions arise, please contact our department. An electronic signature was used to authenticate this note. Elvis Williamson may have a reminder for a \"due or due soon\" health maintenance. I have asked that she contact her primary care provider for follow-up on this health maintenance. There are no Patient Instructions on file for this visit. Please follow up with your PCP for any health maintenance as recommended         Kimmy Coats,as dictated by, Spencer Quinn.   11/1/2021  8:24 AM

## 2021-11-01 ENCOUNTER — OFFICE VISIT (OUTPATIENT)
Dept: ORTHOPEDIC SURGERY | Age: 56
End: 2021-11-01
Payer: MEDICARE

## 2021-11-01 VITALS
HEART RATE: 84 BPM | OXYGEN SATURATION: 99 % | WEIGHT: 221.4 LBS | HEIGHT: 64 IN | TEMPERATURE: 98.3 F | BODY MASS INDEX: 37.8 KG/M2

## 2021-11-01 DIAGNOSIS — M19.072 OSTEOARTHRITIS OF LEFT FOOT, UNSPECIFIED OSTEOARTHRITIS TYPE: Primary | ICD-10-CM

## 2021-11-01 DIAGNOSIS — E11.9 CONTROLLED TYPE 2 DIABETES MELLITUS WITHOUT COMPLICATION, UNSPECIFIED WHETHER LONG TERM INSULIN USE (HCC): ICD-10-CM

## 2021-11-01 PROCEDURE — G8417 CALC BMI ABV UP PARAM F/U: HCPCS | Performed by: ORTHOPAEDIC SURGERY

## 2021-11-01 PROCEDURE — G8432 DEP SCR NOT DOC, RNG: HCPCS | Performed by: ORTHOPAEDIC SURGERY

## 2021-11-01 PROCEDURE — G8427 DOCREV CUR MEDS BY ELIG CLIN: HCPCS | Performed by: ORTHOPAEDIC SURGERY

## 2021-11-01 PROCEDURE — 2022F DILAT RTA XM EVC RTNOPTHY: CPT | Performed by: ORTHOPAEDIC SURGERY

## 2021-11-01 PROCEDURE — G8756 NO BP MEASURE DOC: HCPCS | Performed by: ORTHOPAEDIC SURGERY

## 2021-11-01 PROCEDURE — 99213 OFFICE O/P EST LOW 20 MIN: CPT | Performed by: ORTHOPAEDIC SURGERY

## 2021-11-01 PROCEDURE — 3046F HEMOGLOBIN A1C LEVEL >9.0%: CPT | Performed by: ORTHOPAEDIC SURGERY

## 2021-11-01 PROCEDURE — 3017F COLORECTAL CA SCREEN DOC REV: CPT | Performed by: ORTHOPAEDIC SURGERY

## 2022-03-09 ENCOUNTER — OFFICE VISIT (OUTPATIENT)
Dept: ORTHOPEDIC SURGERY | Age: 57
End: 2022-03-09
Payer: MEDICARE

## 2022-03-09 VITALS
TEMPERATURE: 96.6 F | OXYGEN SATURATION: 96 % | BODY MASS INDEX: 35.03 KG/M2 | HEART RATE: 77 BPM | WEIGHT: 218 LBS | HEIGHT: 66 IN

## 2022-03-09 DIAGNOSIS — M25.512 BILATERAL SHOULDER PAIN, UNSPECIFIED CHRONICITY: Primary | ICD-10-CM

## 2022-03-09 DIAGNOSIS — M19.012 PRIMARY OSTEOARTHRITIS OF LEFT SHOULDER: ICD-10-CM

## 2022-03-09 DIAGNOSIS — M75.101 ROTATOR CUFF TEAR ARTHROPATHY OF RIGHT SHOULDER: ICD-10-CM

## 2022-03-09 DIAGNOSIS — M12.811 ROTATOR CUFF TEAR ARTHROPATHY OF RIGHT SHOULDER: ICD-10-CM

## 2022-03-09 DIAGNOSIS — M25.511 BILATERAL SHOULDER PAIN, UNSPECIFIED CHRONICITY: Primary | ICD-10-CM

## 2022-03-09 PROCEDURE — G8417 CALC BMI ABV UP PARAM F/U: HCPCS | Performed by: ORTHOPAEDIC SURGERY

## 2022-03-09 PROCEDURE — 73030 X-RAY EXAM OF SHOULDER: CPT | Performed by: ORTHOPAEDIC SURGERY

## 2022-03-09 PROCEDURE — G8432 DEP SCR NOT DOC, RNG: HCPCS | Performed by: ORTHOPAEDIC SURGERY

## 2022-03-09 PROCEDURE — 99214 OFFICE O/P EST MOD 30 MIN: CPT | Performed by: ORTHOPAEDIC SURGERY

## 2022-03-09 PROCEDURE — 3017F COLORECTAL CA SCREEN DOC REV: CPT | Performed by: ORTHOPAEDIC SURGERY

## 2022-03-09 PROCEDURE — G8427 DOCREV CUR MEDS BY ELIG CLIN: HCPCS | Performed by: ORTHOPAEDIC SURGERY

## 2022-03-09 PROCEDURE — G8756 NO BP MEASURE DOC: HCPCS | Performed by: ORTHOPAEDIC SURGERY

## 2022-03-09 NOTE — PROGRESS NOTES
Patient: Pepe Biggs                MRN: [id number]       SSN: [id number]  YOB: 1965        AGE: 64 y.o. SEX: female  Body mass index is 35.19 kg/m². PCP: Margarita Quiros MD  03/09/22    CHIEF COMPLAINT: Bilateral shoulder pain 7/10    HPI: Pepe Biggs is a 64 y.o. female patient who presents to the office for bilateral shoulder pain. She has been having bilateral shoulder pain for years. The left is slightly worse than the right. She is been told in the past that she needs surgery on both shoulders. She has not had surgery on either shoulder. She has had injections in the shoulders in the past which she says has helped slightly. She also takes anti-inflammatory topical medications from both shoulders. Past Medical History:   Diagnosis Date    Diabetes ([address]     High cholesterol     Hypertension     Left ankle pain     Sleep apnea        Family History   Problem Relation Age of Onset    Heart Failure Mother     Kidney Disease Sister     Cancer Brother        Current Outpatient Medications   Medication Sig Dispense Refill    albuterol (PROVENTIL VENTOLIN) 2.5 mg /3 mL (0.083 %) nebu Take 2.5 mg by inhalation three (3) times daily as needed.       amLODIPine (NORVASC) 10 mg tablet take 1 tablet by mouth once daily      diclofenac (VOLTAREN) 1 % gel apply 4 grams to affected area four times a day      metFORMIN (GLUCOPHAGE) 500 mg tablet take 1 tablet by mouth twice a day with food      metoprolol succinate (TOPROL-XL) 50 mg XL tablet take 1 tablet by mouth once daily      omeprazole (PRILOSEC) 20 mg capsule take 1 capsule by mouth twice a day      valsartan-hydroCHLOROthiazide (DIOVAN-HCT) 320-25 mg per tablet take 1 tablet by mouth once daily      furosemide (LASIX) 20 mg tablet take 1 tablet by mouth once daily if needed (Patient not taking: Reported on 3/9/2022)      metaxalone (SKELAXIN) 800 mg tablet Take 800 mg by mouth three (3) times daily as needed. (Patient not taking: Reported on 3/9/2022)      meloxicam (MOBIC) 15 mg tablet Take 1 Tab by mouth daily. (Patient not taking: Reported on 3/9/2022) 30 Tab 0    HYDROcodone-acetaminophen (NORCO) 5-325 mg per tablet Take  by mouth. (Patient not taking: Reported on 3/9/2022)         No Known Allergies    Past Surgical History:   Procedure Laterality Date    HX HERNIA REPAIR      HX KNEE REPLACEMENT         Social History     Socioeconomic History    Marital status:      Spouse name: Not on file    Number of children: Not on file    Years of education: Not on file    Highest education level: Not on file   Occupational History    Not on file   Tobacco Use    Smoking status: Never Smoker    Smokeless tobacco: Never Used   Vaping Use    Vaping Use: Never used   Substance and Sexual Activity    Alcohol use: Yes     Comment: occasional    Drug use: Never    Sexual activity: Not on file   Other Topics Concern    Not on file   Social History Narrative    Not on file     Social Determinants of Health     Financial Resource Strain:     Difficulty of Paying Living Expenses: Not on file   Food Insecurity:     Worried About Running Out of Food in the Last Year: Not on file    Franck of Food in the Last Year: Not on file   Transportation Needs:     Lack of Transportation (Medical): Not on file    Lack of Transportation (Non-Medical):  Not on file   Physical Activity:     Days of Exercise per Week: Not on file    Minutes of Exercise per Session: Not on file   Stress:     Feeling of Stress : Not on file   Social Connections:     Frequency of Communication with Friends and Family: Not on file    Frequency of Social Gatherings with Friends and Family: Not on file    Attends Jainism Services: Not on file    Active Member of Clubs or Organizations: Not on file    Attends Club or Organization Meetings: Not on file    Marital Status: Not on file   Intimate Partner Violence:     Fear of Current or Ex-Partner: Not on file    Emotionally Abused: Not on file    Physically Abused: Not on file    Sexually Abused: Not on file   Housing Stability:     Unable to Pay for Housing in the Last Year: Not on file    Number of Places Lived in the Last Year: Not on file    Unstable Housing in the Last Year: Not on file       REVIEW OF SYSTEMS:    14 point review of systems on the intake form is negative except as noted in the HPI    PHYSICAL EXAMINATION:  Visit Vitals  Pulse 77   Temp (!) 96.6 °F (35.9 °C) (Temporal)   Ht 5' 6\" (1.676 m)   Wt 218 lb (98.9 kg)   SpO2 96%   BMI 35.19 kg/m²     Body mass index is 35.19 kg/m². GENERAL: Alert and oriented x3, in no acute distress, well-developed, well-nourished. HEENT: Normocephalic, atraumatic. Shoulder Examination     R   L  ROM   FF  140   140  ER  Full   Full   IR  Full   Full  Rotator Cuff Pain   Supra  +   +   Infra  -   -   Subscap -   -  Crepitus  +   +  Effusion  -   -  Warmth  -   -   Erythema  -   -  Instability  -   -  AC Joint TTP  -   -  Clavicle   Deformity -   -   TTP  -   -  Proximal Humerus   Deformity -   -   TTP  -   -  Deltoid Strength 5   5  Biceps Strength 5   5  Biceps Deformity -   -  Biceps Groove Pain -   -  Impingement Sign -   -       IMAGING:  X-rays 4 views of the right shoulder were taken in the office today. These show glenohumeral osteoarthritis by my interpretation. No fractures. X-rays of the left shoulder were also taken in the office today 4 views. By my interpretation these also show glenohumeral osteoarthritis. An MRI of the right shoulder from 2013 was reviewed. My interpretation is MRI is full-thickness rotator cuff tearing of the supraspinatus. ASSESSMENT & PLAN  Diagnosis: Bilateral shoulder pain and osteoarthritis with likely rotator cuff arthropathy on the right and possible rotator cuff arthropathy on the left. Elizabeth Emanuel has bilateral shoulder pain with osteoarthritis.   She is interested in surgery. This would be a shoulder replacement surgery. Likely a reverse shoulder arthroplasty on the right and possibly reverse shoulder arthroplasty on the left depending on the status of the rotator cuff. Her MRI is almost 5years old. At this point I recommended repeat MRIs of both shoulders to further evaluate the rotator cuff and the arthritis. I will see her back after those are completed. Prescription medication management discussed. Electronically signed by: Jack Bonilla MD    Note: This note was completed using voice recognition software.   Any typographical/name errors or mistakes are unintentional.